# Patient Record
Sex: FEMALE | Race: WHITE | Employment: OTHER | URBAN - METROPOLITAN AREA
[De-identification: names, ages, dates, MRNs, and addresses within clinical notes are randomized per-mention and may not be internally consistent; named-entity substitution may affect disease eponyms.]

---

## 2021-07-31 ENCOUNTER — HOSPITAL ENCOUNTER (EMERGENCY)
Age: 71
Discharge: ANOTHER HOSPITAL | End: 2021-07-31
Attending: EMERGENCY MEDICINE
Payer: MEDICARE

## 2021-07-31 ENCOUNTER — APPOINTMENT (OUTPATIENT)
Dept: CT IMAGING | Age: 71
End: 2021-07-31
Payer: MEDICARE

## 2021-07-31 VITALS
HEIGHT: 64 IN | WEIGHT: 264 LBS | DIASTOLIC BLOOD PRESSURE: 67 MMHG | OXYGEN SATURATION: 91 % | TEMPERATURE: 96.8 F | SYSTOLIC BLOOD PRESSURE: 126 MMHG | BODY MASS INDEX: 45.07 KG/M2 | RESPIRATION RATE: 17 BRPM

## 2021-07-31 DIAGNOSIS — R22.1 MASS OF LATERAL NECK: Primary | ICD-10-CM

## 2021-07-31 PROCEDURE — 99283 EMERGENCY DEPT VISIT LOW MDM: CPT

## 2021-07-31 PROCEDURE — 70490 CT SOFT TISSUE NECK W/O DYE: CPT

## 2021-07-31 RX ORDER — AMLODIPINE BESYLATE 2.5 MG/1
TABLET ORAL
COMMUNITY
Start: 2021-06-07

## 2021-07-31 RX ORDER — CHLORHEXIDINE GLUCONATE 0.12 MG/ML
15 RINSE ORAL
COMMUNITY
Start: 2021-06-28

## 2021-07-31 RX ORDER — HYDROCODONE BITARTRATE AND ACETAMINOPHEN 5; 325 MG/1; MG/1
TABLET ORAL
COMMUNITY

## 2021-07-31 RX ORDER — ALBUMIN (HUMAN) 12.5 G/50ML
25 SOLUTION INTRAVENOUS
COMMUNITY
Start: 2021-07-03

## 2021-07-31 RX ORDER — SERTRALINE HYDROCHLORIDE 100 MG/1
TABLET, FILM COATED ORAL
COMMUNITY

## 2021-07-31 RX ORDER — LISINOPRIL 10 MG/1
TABLET ORAL
COMMUNITY

## 2021-07-31 RX ORDER — SIMETHICONE 125 MG
125 TABLET,CHEWABLE ORAL
COMMUNITY
Start: 2021-07-01

## 2021-07-31 RX ORDER — POLYETHYLENE GLYCOL 3350, SODIUM CHLORIDE, SODIUM BICARBONATE, POTASSIUM CHLORIDE 420; 11.2; 5.72; 1.48 G/4L; G/4L; G/4L; G/4L
POWDER, FOR SOLUTION ORAL
COMMUNITY

## 2021-07-31 RX ORDER — ROSUVASTATIN CALCIUM 10 MG/1
TABLET, COATED ORAL
COMMUNITY

## 2021-07-31 RX ORDER — AMOXICILLIN 250 MG
2 CAPSULE ORAL
COMMUNITY
Start: 2021-06-28

## 2021-07-31 RX ORDER — LEVOTHYROXINE SODIUM 0.2 MG/1
200 TABLET ORAL DAILY
COMMUNITY
Start: 2021-06-29 | End: 2021-08-03

## 2021-07-31 RX ORDER — OMEGA-3-ACID ETHYL ESTERS 1 G/1
CAPSULE, LIQUID FILLED ORAL
COMMUNITY

## 2021-07-31 RX ORDER — SEVELAMER CARBONATE 800 MG/1
800 TABLET, FILM COATED ORAL
COMMUNITY
Start: 2021-07-10

## 2021-07-31 RX ORDER — LOVASTATIN 10 MG/1
TABLET ORAL
COMMUNITY

## 2021-07-31 RX ORDER — MECLIZINE HCL 12.5 MG/1
TABLET ORAL
COMMUNITY

## 2021-07-31 RX ORDER — ALBUTEROL SULFATE 90 UG/1
AEROSOL, METERED RESPIRATORY (INHALATION)
COMMUNITY

## 2021-07-31 RX ORDER — INSULIN GLARGINE 100 [IU]/ML
INJECTION, SOLUTION SUBCUTANEOUS
COMMUNITY

## 2021-07-31 RX ORDER — METOPROLOL SUCCINATE 25 MG/1
TABLET, EXTENDED RELEASE ORAL
COMMUNITY

## 2021-07-31 RX ORDER — ALLOPURINOL 100 MG/1
100 TABLET ORAL DAILY
COMMUNITY

## 2021-07-31 RX ORDER — ALPRAZOLAM 1 MG/1
TABLET ORAL
COMMUNITY

## 2021-07-31 RX ORDER — ATORVASTATIN CALCIUM 10 MG/1
40 TABLET, FILM COATED ORAL DAILY
COMMUNITY

## 2021-07-31 RX ORDER — CALCITRIOL 0.25 UG/1
0.25 CAPSULE, LIQUID FILLED ORAL DAILY
COMMUNITY
Start: 2021-06-25 | End: 2021-08-03

## 2021-07-31 RX ORDER — SODIUM BICARBONATE 650 MG/1
650 TABLET ORAL 2 TIMES DAILY
COMMUNITY
Start: 2021-06-25

## 2021-07-31 ASSESSMENT — PAIN DESCRIPTION - LOCATION: LOCATION: MOUTH;NECK

## 2021-07-31 ASSESSMENT — PAIN DESCRIPTION - PAIN TYPE: TYPE: ACUTE PAIN

## 2021-07-31 ASSESSMENT — PAIN SCALES - GENERAL: PAINLEVEL_OUTOF10: 3

## 2021-07-31 NOTE — ED PROVIDER NOTES
suicidal ideas. Past Medical History:   Diagnosis Date    Aftercare following surgery for neoplasm     Anxiety     Arthropathia     Atrial fibrillation ([facility] [facility]     Carcinoma in situ of tongue     Chronic kidney disease     Chronic kidney disease, stage V (HCC)     Dependence on renal dialysis ([facility] [address] [facility]     Depression     Dysarthria and anarthria     Dysphagia, oral phase     Edema     Essential tremor     Headache     Heart failure (HCC)     Hyperlipemia     Hypertension     Macular degeneration     Malignant neoplasm of thyroid gland (HCC)     Morbid obesity (HCC)     Obstructive sleep apnea     Osteoarthritis, localized     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 No pertinent family history. Social History     Socioeconomic History    Marital status: Single     Spouse name: Not on file    Number of children: Not on file    Years of education: Not on file    Highest education level: Not on file   Occupational History    Not on file   Tobacco Use    Smoking status: Not on file   Substance and Sexual Activity    Alcohol use: Not on file    Drug use: Not on file    Sexual activity: Not on file   Other Topics Concern    Not on file   Social History Narrative    Not on file     Social Determinants of Health     Financial Resource Strain:     Difficulty of Paying Living Expenses:    Food Insecurity:     Worried About Running Out of Food in the Last Year:     [address] in the Last Year:    Transportation Needs:     Lack of Transportation (Medical):      Lack of Transportation (Non-Medical):    Physical Activity:     Days of Exercise per Week:     Minutes of Exercise per Session:    Stress:     Feeling of Stress :    Social Connections:     Frequency of Communication with Friends and Family:     Frequency of Social Gatherings with Friends and Family:     Attends [employer] Services:     Active Member of Clubs or Organizations:     Attends Club or Organization Meetings:     Marital Status:    Intimate Partner Violence:     Fear of Current or Ex-Partner:     Emotionally Abused:     Physically Abused:     Sexually Abused:      No current facility-administered medications for this encounter.      Current Outpatient Medications   Medication Sig Dispense Refill    albumin human 25 % IV solution Infuse 25 g intravenously      calcitRIOL (ROCALTROL) 0.25 MCG capsule Take 0.25 mcg by mouth daily      chlorhexidine (PERIDEX) 0.12 % solution Take 15 mLs by mouth      vitamin D (CHOLECALCIFEROL) 25 MCG (1000 UT) TABS tablet Take by mouth      sevelamer (RENVELA) 800 MG tablet Take 800 mg by mouth      metoprolol tartrate (LOPRESSOR) 25 MG tablet TAKE 1 TABLET BY MOUTH TWICE A DAY      sodium bicarbonate 650 MG tablet Take 650 mg by mouth 2 times daily      simethicone (MYLICON) 450 MG chewable tablet Take 125 mg by mouth      senna-docusate (PERICOLACE) 8.6-50 MG per tablet Take 2 tablets by mouth      levothyroxine (SYNTHROID) 200 MCG tablet Take 200 mcg by mouth daily      amLODIPine (NORVASC) 2.5 MG tablet       atorvastatin (LIPITOR) 10 MG tablet Take 40 mg by mouth daily      albuterol sulfate HFA (PROAIR HFA) 108 (90 Base) MCG/ACT inhaler ProAir HFA 90 mcg/actuation aerosol inhaler      ALPRAZolam (XANAX) 1 MG tablet alprazolam 1 mg tablet      lisinopril (PRINIVIL;ZESTRIL) 10 MG tablet lisinopril 10 mg tablet      metFORMIN (GLUCOPHAGE) 1000 MG tablet metformin 1,000 mg tablet      metoprolol succinate (TOPROL XL) 25 MG extended release tablet metoprolol succinate ER 25 mg tablet,extended release 24 hr      sertraline (ZOLOFT) 100 MG tablet sertraline 100 mg tablet      rosuvastatin (CRESTOR) 10 MG tablet Crestor 10 mg tablet      polyethylene glycol-electrolytes (GAVILYTE-N WITH FLAVOR PACK) 420 g solution GaviLyte-N 420 gram oral solution      omega-3 acid ethyl esters (LOVAZA) 1 g capsule Lovaza 1 gram capsule      meclizine (ANTIVERT) 12.5 MG tablet meclizine 12.5 mg tablet      lovastatin (MEVACOR) 10 MG tablet lovastatin 10 mg tablet      insulin glargine (LANTUS SOLOSTAR) 100 UNIT/ML injection pen Lantus Solostar U-100 Insulin 100 unit/mL (3 mL) subcutaneous pen      allopurinol (ZYLOPRIM) 100 MG tablet Take 100 mg by mouth daily      HYDROcodone-acetaminophen (NORCO) 5-325 MG per tablet hydrocodone 5 mg-acetaminophen 500 mg tablet   Take 1 tablet every 6 hours by oral route. Allergies   Allergen Reactions    Diltiazem     Iodinated Diagnostic Agents     Sulfa Antibiotics        Nursing Notes Reviewed     Physical Exam:   ED Triage Vitals [07/31/21 1510]   Enc Vitals Group      /67      Pulse       Resp 17      Temp 96.8 °F (36 °C)      Temp src       SpO2 91 %      Weight 264 lb (119.7 kg)      Height 5' 4\" (1.626 m)      Head Circumference       Peak Flow       Pain Score       Pain Loc       Pain Edu? Excl. in [address]? /67   Temp 96.8 °F (36 °C)   Resp 17   Ht 5' 4\" (1.626 m)   Wt 264 lb (119.7 kg)   SpO2 91%   BMI 45.32 kg/m²   My pulse ox interpretation is - normal  Physical Exam  Vitals and nursing note reviewed. Constitutional:       General: She is not in acute distress. Appearance: She is well-developed. She is not toxic-appearing or diaphoretic. Comments: Tolerating secretions. HENT:      Head: Normocephalic and atraumatic. Mouth/Throat:      Tongue: Tongue does not deviate from midline. Eyes:      General:         Right eye: No discharge. Left eye: No discharge. Conjunctiva/sclera: Conjunctivae normal.   Neck:     Cardiovascular:      Rate and Rhythm: Normal rate and regular rhythm. Pulmonary:      Effort: Pulmonary effort is normal. No respiratory distress. Breath sounds: Normal breath sounds. Abdominal:      General: There is no distension. Palpations: Abdomen is soft. Tenderness: There is no abdominal tenderness. Musculoskeletal:         General: No swelling or signs of injury. Normal range of motion. Skin:     General: Skin is warm and dry. Comments: Mild erythema over right lateral neck. Incision appears well-healed with no areas of drainage. Neurological:      General: No focal deficit present. Mental Status: She is alert. Cranial Nerves: No cranial nerve deficit. Psychiatric:         Mood and Affect: Mood normal.         Behavior: Behavior normal.         I have reviewed and interpreted all of the currently available lab results from this visit (if applicable):  No results found for this visit on 07/31/21. Radiographs (if obtained):  [] The following radiograph was interpreted by myself in the absence of a radiologist:  [x]Radiologist's Report Reviewed:  CT SOFT TISSUE NECK WO CONTRAST   Final Result   Subcutaneous mass measuring 2.3 x 4.6 x 2.5 cm adjacent to the surgical bed   is suspicious for recurrent disease. Further evaluation with PET-CT and/or   CT neck soft tissue with IV contrast recommended. EKG (if obtained): (All EKG's are interpreted by myself in the absence of a cardiologist)    MDM:  Differential diagnoses considered include but are not limited to cellulitis, infection, worsening cancer, thyroid growth, postoperative scar tissue. Patient is well-appearing and in no acute distress. Tolerating her secretions. No stridor or difficulty breathing. Airway intact. CT scan of her neck showed a subcutaneous mass adjacent to the surgical bed which is suspicious for recurrent disease. Patient has not had any fevers, CT scan is not suspicious for infection and the skin area appears more like scar tissue than cellulitis. I do not suspect an infection.   I suspect the increased swelling is all due to recurrent disease. Explained that she needs to follow-up closely with her head and neck surgeon. We will discharge her back to her care facility at this time. Plan of care explained to patient. Concerning signs and symptoms warranting a return visit to the Emergency Department were explained in detail. All questions and concerns were addressed to the patient's satisfaction. Patient understood and agreed with plan. I did don appropriate PPE (including face mask, protective eye ware/safety glasses and gloves), as recommended by the health facility/national standard best practice, during my bedside interactions with the patient. The likelihood of other entities in the differential is insufficient to justify any further testing for them. This was explained to the patient. The patient was advised that persistent or worsening symptoms would requirefurther evaluation. Clinical Impression:  1. Mass of lateral neck          Lori Jackson MD       Please note that portions of this note may have been complete with a voice recognition program.  Effortswere made to edit the dictations, but occasional words are mis-transcribed.           Lori Jackson MD  08/09/21 1568

## 2021-07-31 NOTE — ED TRIAGE NOTES
Pt reports having her thyroid removed about a month ago with partial tongue removal. Pt noticed some redness and swelling to incision site and was concerned about infection  Medics states the NH staff took a picture of the incision and sent it to the surgeon and he just told them it was bruised  Pt wanted to be transported and have it checked out

## 2021-08-09 ASSESSMENT — ENCOUNTER SYMPTOMS
SHORTNESS OF BREATH: 0
EYE REDNESS: 0
ABDOMINAL PAIN: 0
RHINORRHEA: 0
COUGH: 0
VOMITING: 0
VOICE CHANGE: 1
BACK PAIN: 0
SORE THROAT: 1
NAUSEA: 0
TROUBLE SWALLOWING: 0